# Patient Record
Sex: FEMALE | Race: OTHER | Employment: UNEMPLOYED | URBAN - METROPOLITAN AREA
[De-identification: names, ages, dates, MRNs, and addresses within clinical notes are randomized per-mention and may not be internally consistent; named-entity substitution may affect disease eponyms.]

---

## 2021-04-29 ENCOUNTER — HOSPITAL ENCOUNTER (EMERGENCY)
Facility: HOSPITAL | Age: 56
Discharge: HOME | End: 2021-04-29
Attending: EMERGENCY MEDICINE
Payer: COMMERCIAL

## 2021-04-29 ENCOUNTER — APPOINTMENT (EMERGENCY)
Dept: RADIOLOGY | Facility: HOSPITAL | Age: 56
End: 2021-04-29
Attending: EMERGENCY MEDICINE
Payer: COMMERCIAL

## 2021-04-29 ENCOUNTER — APPOINTMENT (EMERGENCY)
Dept: RADIOLOGY | Facility: HOSPITAL | Age: 56
End: 2021-04-29
Payer: COMMERCIAL

## 2021-04-29 VITALS
WEIGHT: 213 LBS | HEIGHT: 64 IN | OXYGEN SATURATION: 98 % | DIASTOLIC BLOOD PRESSURE: 61 MMHG | SYSTOLIC BLOOD PRESSURE: 141 MMHG | BODY MASS INDEX: 36.37 KG/M2 | RESPIRATION RATE: 18 BRPM | HEART RATE: 79 BPM | TEMPERATURE: 98.2 F

## 2021-04-29 DIAGNOSIS — D17.23 LIPOMA OF RIGHT LOWER EXTREMITY: ICD-10-CM

## 2021-04-29 DIAGNOSIS — M79.604 PAIN OF RIGHT LOWER EXTREMITY: Primary | ICD-10-CM

## 2021-04-29 PROCEDURE — 99284 EMERGENCY DEPT VISIT MOD MDM: CPT | Mod: 25

## 2021-04-29 PROCEDURE — 93971 EXTREMITY STUDY: CPT | Mod: RT

## 2021-04-29 PROCEDURE — 76882 US LMTD JT/FCL EVL NVASC XTR: CPT

## 2021-04-29 RX ORDER — LISINOPRIL 10 MG/1
TABLET ORAL
COMMUNITY
Start: 2021-04-22

## 2021-04-29 RX ORDER — METFORMIN HYDROCHLORIDE 500 MG/1
TABLET ORAL
COMMUNITY
Start: 2021-04-27

## 2021-04-29 ASSESSMENT — ENCOUNTER SYMPTOMS
FEVER: 0
COUGH: 0
DIFFICULTY URINATING: 0
CONFUSION: 0
DYSURIA: 0
FREQUENCY: 0
ABDOMINAL PAIN: 0
SHORTNESS OF BREATH: 0

## 2021-04-29 NOTE — DISCHARGE INSTRUCTIONS
Follow-up with your family provider.     Apply warm compresses.   Tylenol as needed for pain.   Elevate your legs as much as possible.  Try compression stockings.     You have a lipoma.  He can follow-up with surgery for further evaluation and possible removal.    Please return the emergency department with any new or worsening symptoms.

## 2021-04-29 NOTE — ED PROVIDER NOTES
"HPI    57yo F presents to the ED via [facility] for c/o right proximal thigh TTP x 1 week and right superior posterior knee TTP with swelling x 6 months. Ambulatory. No wounds. No UTI or STD symptoms. chronic c section scar pain x years. Sent in from urgent care to eval for DVT. Patient did have varicose vein infiltration in the proximal thigh 1 month ago.     Past Medical History:   Diagnosis Date   • Hypertension    • Type 2 diabetes mellitus (CMS/HCC)    • Varicose vein of leg        Past Surgical History:   Procedure Laterality Date   • CHOLECYSTECTOMY     • HYSTERECTOMY     • LEFT OOPHORECTOMY         No Known Allergies    Social History     Tobacco Use   Smoking Status Never Smoker   Smokeless Tobacco Never Used       Social History     Substance and Sexual Activity   Alcohol Use Not Currently       Social History     Substance and Sexual Activity   Drug Use Not Currently       No LMP recorded. Patient is postmenopausal.    Review of Systems   Constitutional: Negative for fever.   HENT: Negative for congestion.    Respiratory: Negative for cough and shortness of breath.    Cardiovascular: Negative for chest pain.   Gastrointestinal: Negative for abdominal pain.   Genitourinary: Negative for difficulty urinating, dysuria, frequency, urgency, vaginal bleeding, vaginal discharge and vaginal pain.   Musculoskeletal: Negative for gait problem.   Skin: Negative for rash.   Psychiatric/Behavioral: Negative for confusion.       Vitals:    04/29/21 1242   BP: (!) 141/61   BP Location: Right upper arm   Patient Position: Sitting   Pulse: 79   Resp: 18   Temp: 36.8 °C (98.2 °F)   TempSrc: Temporal   SpO2: 98%   Weight: 96.6 kg (213 lb)   Height: 1.626 m (5' 4\")       Physical Exam  Vitals and nursing note reviewed.   Constitutional:       General: She is not in acute distress.     Appearance: She is not ill-appearing or toxic-appearing.   HENT:      Head: Normocephalic and atraumatic.      Mouth/Throat:      Pharynx: Oropharynx " is clear.   Eyes:      Conjunctiva/sclera: Conjunctivae normal.   Cardiovascular:      Rate and Rhythm: Normal rate and regular rhythm.      Pulses: Normal pulses.   Pulmonary:      Effort: Pulmonary effort is normal.      Breath sounds: Normal breath sounds.   Abdominal:      General: Abdomen is flat. There is no distension.      Palpations: Abdomen is soft.      Comments: TTP of c section scar pt states is chronic, no erythema or warmth or drainage.    Musculoskeletal:         General: Normal range of motion.      Cervical back: Neck supple.      Comments: posterior knee, superiorly is a 6cm in lnegth soft area of TTP with varicosity. No erythema, warmth, or rashes, or wounds.     Right proximal thigh pea size firm area that is mobile without erythema, warmth, or edema in this area. No rashes or wounds.     No TTP in inguinal canal or LAD in the inguinal area.     No calf pain, erythema, warmth, or tenderenss.    Skin:     General: Skin is warm and dry.      Capillary Refill: Capillary refill takes less than 2 seconds.   Neurological:      Mental Status: She is alert and oriented to person, place, and time.         Procedures    ED Course as of Apr 29 1445   Thu Apr 29, 2021   1252 Impression: peasize firm mobile mass in R pox thigh x this week. Not on inguinal area. No signs of infection. Superior post knee fat with TTP x 6 mo. No signs of abscess or cellulitis. Less suspicion for DVT.     Plan: RLE venous duplex. Soft tissue ultrasound of both those areas.     [AO]   1413 RLE venous duplex:   IMPRESSION:  No evidence of deep venous thrombosis in the right lower extremity.    [AO]   1432 Soft tissue US:   IMPRESSION:     1.  A 2 cm x 0.9 cm x 2.5 cm subcutaneous lipoma correlates to the palpable  abnormality in the proximal lateral right thigh.  2.  Superficial varicosities correlate to area of palpable abnormality in the  distal posterior right thigh.  These appear patent.    [AO]   1434 Palpable mass in  proximal thigh is subcutaneous fat.  Lipoma noted.  Will provide surgery follow-up.    [AO]      ED Course User Index  [AO] Quiana Gardiner CRNP         Clinical Impressions as of Apr 29 1445   Pain of right lower extremity   Lipoma of right lower extremity       Final diagnoses:   [M79.604] Pain of right lower extremity   [D17.23] Lipoma of right lower extremity       Labs Reviewed - No data to display    ULTRASOUND SOFT TISSUE EXTREMITY   Final Result   IMPRESSION:      1.  A 2 cm x 0.9 cm x 2.5 cm subcutaneous lipoma correlates to the palpable   abnormality in the proximal lateral right thigh.   2.  Superficial varicosities correlate to area of palpable abnormality in the   distal posterior right thigh.  These appear patent.      US venous leg, RL extremity   Final Result   IMPRESSION:   No evidence of deep venous thrombosis in the right lower extremity.                Quiana Gardiner CRNP  04/29/21 1442

## 2021-05-01 NOTE — ED ATTESTATION NOTE
Procedures  Physical Exam  Review of Systems    4/30/20218:13 PM  I have personally seen and examined the patient.  I reviewed and agree with the PA/NP/Resident's assessment and plan of care.    My examination, assessment, and plan of care of Josias Hunt is as follows:    The patient presents with right thigh pain posteriorly where there is some swelling and tenderness.  She has had this for 6 months..  There is also some right superior there is also a small pea-sized lump in the right upper thigh area.  There has been no fevers or chills.  Is been no vaginal discharge or diarrhea.  He denies having any abdominal pain.    Exam: Awake alert and oriented in mild distress.  There is decrease is probably is a lymph node in the anterior upper thigh.  There is no discoloration of the skin.  And whether this is.  Feel fluctuant.  It is also does not feel rockhard.  There is also a swollen area perhaps about 8 x 4 cm back of the lower thigh area.  It feels like there may be some fluid inside.  It is moderately tender to palpation as well.    Impression/Plan: Ultrasound of the right lower extremity showed that the more anterior lateral upper thigh area noted with most consistent with a lipoma and what was in the back of the thigh superior to the knee with most consistent with vascular structures.  We did not find anything dangerous.  Agree with NP management and discharge.    Vital Signs Review: Vital signs have been reviewed. The oxygen saturation is  SpO2: 98 % which is normal.    This document was created using dragon dictation software.  There might be some typographical errors due to this technology.     Jairon Meyer MD  04/30/21 2017

## 2021-05-18 ENCOUNTER — TRANSCRIBE ORDERS (OUTPATIENT)
Dept: SCHEDULING | Age: 56
End: 2021-05-18

## 2021-05-18 DIAGNOSIS — K40.90 UNILATERAL INGUINAL HERNIA, WITHOUT OBSTRUCTION OR GANGRENE, NOT SPECIFIED AS RECURRENT: Primary | ICD-10-CM

## 2022-07-28 ENCOUNTER — APPOINTMENT (RX ONLY)
Dept: URBAN - METROPOLITAN AREA CLINIC 374 | Facility: CLINIC | Age: 57
Setting detail: DERMATOLOGY
End: 2022-07-28

## 2022-07-28 DIAGNOSIS — L259 CONTACT DERMATITIS AND OTHER ECZEMA, UNSPECIFIED CAUSE: ICD-10-CM

## 2022-07-28 DIAGNOSIS — L72.3 SEBACEOUS CYST: ICD-10-CM

## 2022-07-28 PROBLEM — L23.9 ALLERGIC CONTACT DERMATITIS, UNSPECIFIED CAUSE: Status: ACTIVE | Noted: 2022-07-28

## 2022-07-28 PROCEDURE — ? PHOTO-DOCUMENTATION

## 2022-07-28 PROCEDURE — ? PRESCRIPTION

## 2022-07-28 PROCEDURE — 99203 OFFICE O/P NEW LOW 30 MIN: CPT

## 2022-07-28 PROCEDURE — ? CPT CODE GENERATOR

## 2022-07-28 PROCEDURE — ? COUNSELING

## 2022-07-28 PROCEDURE — ? DEFER

## 2022-07-28 PROCEDURE — ? PRESCRIPTION MEDICATION MANAGEMENT

## 2022-07-28 RX ORDER — TRIAMCINOLONE ACETONIDE 1 MG/G
1 CREAM TOPICAL BID
Qty: 80 | Refills: 1 | Status: ERX | COMMUNITY
Start: 2022-07-28

## 2022-07-28 RX ADMIN — TRIAMCINOLONE ACETONIDE 1: 1 CREAM TOPICAL at 00:00

## 2022-07-28 ASSESSMENT — LOCATION DETAILED DESCRIPTION DERM
LOCATION DETAILED: RIGHT SUPERIOR LATERAL LOWER BACK
LOCATION DETAILED: LEFT SUPERIOR LATERAL LOWER BACK
LOCATION DETAILED: LEFT POSTERIOR SHOULDER
LOCATION DETAILED: RIGHT LATERAL ABDOMEN
LOCATION DETAILED: LEFT LATERAL ABDOMEN

## 2022-07-28 ASSESSMENT — LOCATION SIMPLE DESCRIPTION DERM
LOCATION SIMPLE: ABDOMEN
LOCATION SIMPLE: LEFT SHOULDER
LOCATION SIMPLE: LEFT LOWER BACK
LOCATION SIMPLE: RIGHT LOWER BACK

## 2022-07-28 ASSESSMENT — LOCATION ZONE DERM
LOCATION ZONE: TRUNK
LOCATION ZONE: ARM

## 2022-07-28 NOTE — PROCEDURE: CPT CODE GENERATOR
06533 Price: 0.00
J Code Units: 1
Show Malignant Destruction Codes?: No
Anticipated Depth And Size Of Soft Tissue Excision (Required): Subcutaneous, Less than 1.5 cm
Which Photosensitizer Was Used?: Levulan
Detail Level: Detailed
Location (Required): Extremities
Anticipated Depth And Size Of Soft Tissue Excision (Required): Subcutaneous, Less than 2 cm
First Biopsy Type: Tangential Biopsy
Number Of Lesions Injected: Less than 8 lesions
First Procedure You Would Like A Quote For?: Benign Excision
How Many Lesions Are You Destroying?: Less than 15
Fee Schedule Discount For Cash Pay Patients In % Off Of Fee Schedule: 0
Repair: Intermediate Primary Closure
Fee Schedule Discount In % Off Of Fee Schedule: Standard Fee Schedule as Entered in Pricing Tab
Anticipated Depth And Size Of Soft Tissue Excision (Required): Subcutaneous, Less than 3 cm
Anticipated Excised Diameter (Required): 2.1 - 3.0 cm

## 2022-07-28 NOTE — PROCEDURE: PRESCRIPTION MEDICATION MANAGEMENT
Render In Strict Bullet Format?: No
Initiate Treatment: Triamcinolone 0.1% cream, apply a thin layer bid to abdomen
Detail Level: Simple

## 2022-07-28 NOTE — PROCEDURE: DEFER
Procedure To Be Performed At Next Visit: Excision
Detail Level: Detailed
Introduction Text (Please End With A Colon): The following was deferred: